# Patient Record
Sex: FEMALE | HISPANIC OR LATINO | Employment: UNEMPLOYED | URBAN - METROPOLITAN AREA
[De-identification: names, ages, dates, MRNs, and addresses within clinical notes are randomized per-mention and may not be internally consistent; named-entity substitution may affect disease eponyms.]

---

## 2019-02-20 ENCOUNTER — HOSPITAL ENCOUNTER (OUTPATIENT)
Dept: LAB | Facility: MEDICAL CENTER | Age: 19
End: 2019-02-20
Attending: OBSTETRICS & GYNECOLOGY
Payer: COMMERCIAL

## 2019-02-20 PROCEDURE — 87491 CHLMYD TRACH DNA AMP PROBE: CPT

## 2019-02-20 PROCEDURE — 87591 N.GONORRHOEAE DNA AMP PROB: CPT

## 2019-02-21 LAB — SPECIMEN SOURCE: NORMAL

## 2020-10-10 ENCOUNTER — HOSPITAL ENCOUNTER (EMERGENCY)
Facility: MEDICAL CENTER | Age: 20
End: 2020-10-10
Payer: COMMERCIAL

## 2020-10-10 ENCOUNTER — HOSPITAL ENCOUNTER (OUTPATIENT)
Facility: MEDICAL CENTER | Age: 20
End: 2020-10-11
Attending: OBSTETRICS & GYNECOLOGY | Admitting: OBSTETRICS & GYNECOLOGY
Payer: COMMERCIAL

## 2020-10-10 VITALS
HEART RATE: 107 BPM | OXYGEN SATURATION: 99 % | HEIGHT: 61 IN | BODY MASS INDEX: 24.06 KG/M2 | DIASTOLIC BLOOD PRESSURE: 90 MMHG | TEMPERATURE: 98.2 F | SYSTOLIC BLOOD PRESSURE: 141 MMHG | RESPIRATION RATE: 18 BRPM | WEIGHT: 127.43 LBS

## 2020-10-10 PROCEDURE — 302449 STATCHG TRIAGE ONLY (STATISTIC)

## 2020-10-11 VITALS
WEIGHT: 123 LBS | TEMPERATURE: 98.3 F | HEIGHT: 61 IN | SYSTOLIC BLOOD PRESSURE: 116 MMHG | HEART RATE: 98 BPM | BODY MASS INDEX: 23.22 KG/M2 | OXYGEN SATURATION: 97 % | DIASTOLIC BLOOD PRESSURE: 68 MMHG | RESPIRATION RATE: 17 BRPM

## 2020-10-11 ASSESSMENT — PAIN SCALES - GENERAL: PAINLEVEL: 0 - NO PAIN

## 2020-10-11 NOTE — PROGRESS NOTES
"0005: Pt presented to LD with c/o contractions/cramps. \"Contraction-like\" pain started around 1800 today. Reports intermittent discomfort. Hasn't felt contractions for about 25 minutes now. Denies LOF/VB. Reports feeling baby move, but \"better with first baby\". Reports previous delivery at home with midwife.   0023: Updated Dr Frye of pt c/o previous contraction, but none currently/no VB/no LOF. Confirmed not to perform SVE. Requested MD view tracing. Per MD, to d/c pt home.   0028: Monitors removed; pt reported feeling baby moving around more prior to taking monitors off.  0037: D/c paperwork signed. D/c instructions given to pt. Questions answered. Pt left unit via ambulation with FOB  "

## 2020-10-11 NOTE — ED TRIAGE NOTES
Patient is 19 weeks pregnant.  Patient started having abdominal cramping around 1800.  Spoke with her midwife who told her to come in and get checked out.  Denies bleeding or discharge.  Nauseous.

## 2020-10-16 ENCOUNTER — HOSPITAL ENCOUNTER (OUTPATIENT)
Dept: RADIOLOGY | Facility: MEDICAL CENTER | Age: 20
End: 2020-10-16
Attending: MIDWIFE
Payer: COMMERCIAL

## 2020-10-16 DIAGNOSIS — Z34.92 NORMAL PREGNANCY IN SECOND TRIMESTER: ICD-10-CM

## 2020-10-16 PROCEDURE — 76805 OB US >/= 14 WKS SNGL FETUS: CPT

## 2020-12-07 ENCOUNTER — HOSPITAL ENCOUNTER (OUTPATIENT)
Facility: MEDICAL CENTER | Age: 20
End: 2020-12-07
Attending: MIDWIFE
Payer: MEDICAID

## 2021-02-17 ENCOUNTER — HOSPITAL ENCOUNTER (OUTPATIENT)
Facility: MEDICAL CENTER | Age: 21
End: 2021-02-17
Attending: MIDWIFE
Payer: COMMERCIAL

## 2021-02-17 PROCEDURE — 87081 CULTURE SCREEN ONLY: CPT

## 2021-02-17 PROCEDURE — 87150 DNA/RNA AMPLIFIED PROBE: CPT

## 2021-02-18 ENCOUNTER — HOSPITAL ENCOUNTER (OUTPATIENT)
Facility: MEDICAL CENTER | Age: 21
End: 2021-02-18
Attending: MIDWIFE
Payer: COMMERCIAL

## 2021-02-23 LAB — GP B STREP DNA SPEC QL NAA+PROBE: NEGATIVE

## 2021-03-18 ENCOUNTER — HOSPITAL ENCOUNTER (OUTPATIENT)
Dept: RADIOLOGY | Facility: MEDICAL CENTER | Age: 21
End: 2021-03-18
Attending: MIDWIFE
Payer: COMMERCIAL

## 2021-03-18 DIAGNOSIS — Z34.93 ENCOUNTER FOR SUPERVISION OF NORMAL PREGNANCY IN THIRD TRIMESTER, UNSPECIFIED GRAVIDITY: ICD-10-CM

## 2021-03-18 PROCEDURE — 76819 FETAL BIOPHYS PROFIL W/O NST: CPT

## 2021-03-18 PROCEDURE — 76820 UMBILICAL ARTERY ECHO: CPT

## 2021-03-19 PROCEDURE — 76820 UMBILICAL ARTERY ECHO: CPT

## 2021-03-22 ENCOUNTER — HOSPITAL ENCOUNTER (OUTPATIENT)
Dept: RADIOLOGY | Facility: MEDICAL CENTER | Age: 21
End: 2021-03-22
Attending: MIDWIFE
Payer: COMMERCIAL

## 2021-03-22 DIAGNOSIS — Z34.93 ENCOUNTER FOR SUPERVISION OF NORMAL PREGNANCY IN THIRD TRIMESTER, UNSPECIFIED GRAVIDITY: ICD-10-CM

## 2021-03-22 PROCEDURE — 76816 OB US FOLLOW-UP PER FETUS: CPT

## 2021-05-21 ENCOUNTER — HOSPITAL ENCOUNTER (EMERGENCY)
Facility: MEDICAL CENTER | Age: 21
End: 2021-05-22
Attending: EMERGENCY MEDICINE
Payer: COMMERCIAL

## 2021-05-21 ENCOUNTER — APPOINTMENT (OUTPATIENT)
Dept: RADIOLOGY | Facility: MEDICAL CENTER | Age: 21
End: 2021-05-21
Attending: EMERGENCY MEDICINE
Payer: COMMERCIAL

## 2021-05-21 DIAGNOSIS — R00.1 BRADYCARDIA: ICD-10-CM

## 2021-05-21 DIAGNOSIS — R01.1 MURMUR, CARDIAC: ICD-10-CM

## 2021-05-21 DIAGNOSIS — R55 SYNCOPE, UNSPECIFIED SYNCOPE TYPE: ICD-10-CM

## 2021-05-21 LAB
ALBUMIN SERPL BCP-MCNC: 4.7 G/DL (ref 3.2–4.9)
ALBUMIN/GLOB SERPL: 1.6 G/DL
ALP SERPL-CCNC: 56 U/L (ref 30–99)
ALT SERPL-CCNC: 24 U/L (ref 2–50)
ANION GAP SERPL CALC-SCNC: 12 MMOL/L (ref 7–16)
AST SERPL-CCNC: 29 U/L (ref 12–45)
BILIRUB SERPL-MCNC: 0.3 MG/DL (ref 0.1–1.5)
BUN SERPL-MCNC: 10 MG/DL (ref 8–22)
CALCIUM SERPL-MCNC: 9.4 MG/DL (ref 8.5–10.5)
CHLORIDE SERPL-SCNC: 106 MMOL/L (ref 96–112)
CO2 SERPL-SCNC: 22 MMOL/L (ref 20–33)
CREAT SERPL-MCNC: 0.72 MG/DL (ref 0.5–1.4)
D DIMER PPP IA.FEU-MCNC: <0.27 UG/ML (FEU) (ref 0–0.5)
EKG IMPRESSION: NORMAL
GLOBULIN SER CALC-MCNC: 2.9 G/DL (ref 1.9–3.5)
GLUCOSE SERPL-MCNC: 88 MG/DL (ref 65–99)
HCG SERPL QL: NEGATIVE
POTASSIUM SERPL-SCNC: 3.5 MMOL/L (ref 3.6–5.5)
PROT SERPL-MCNC: 7.6 G/DL (ref 6–8.2)
SODIUM SERPL-SCNC: 140 MMOL/L (ref 135–145)
TROPONIN T SERPL-MCNC: <6 NG/L (ref 6–19)

## 2021-05-21 PROCEDURE — 85027 COMPLETE CBC AUTOMATED: CPT

## 2021-05-21 PROCEDURE — 93005 ELECTROCARDIOGRAM TRACING: CPT | Performed by: EMERGENCY MEDICINE

## 2021-05-21 PROCEDURE — 83880 ASSAY OF NATRIURETIC PEPTIDE: CPT

## 2021-05-21 PROCEDURE — 700105 HCHG RX REV CODE 258: Performed by: EMERGENCY MEDICINE

## 2021-05-21 PROCEDURE — 99284 EMERGENCY DEPT VISIT MOD MDM: CPT

## 2021-05-21 PROCEDURE — 84703 CHORIONIC GONADOTROPIN ASSAY: CPT

## 2021-05-21 PROCEDURE — 93005 ELECTROCARDIOGRAM TRACING: CPT

## 2021-05-21 PROCEDURE — 85007 BL SMEAR W/DIFF WBC COUNT: CPT

## 2021-05-21 PROCEDURE — 80053 COMPREHEN METABOLIC PANEL: CPT

## 2021-05-21 PROCEDURE — 71045 X-RAY EXAM CHEST 1 VIEW: CPT

## 2021-05-21 PROCEDURE — 84484 ASSAY OF TROPONIN QUANT: CPT

## 2021-05-21 PROCEDURE — 85379 FIBRIN DEGRADATION QUANT: CPT

## 2021-05-21 PROCEDURE — 94760 N-INVAS EAR/PLS OXIMETRY 1: CPT

## 2021-05-21 RX ORDER — SODIUM CHLORIDE 9 MG/ML
1000 INJECTION, SOLUTION INTRAVENOUS ONCE
Status: COMPLETED | OUTPATIENT
Start: 2021-05-21 | End: 2021-05-22

## 2021-05-21 RX ADMIN — SODIUM CHLORIDE 1000 ML: 9 INJECTION, SOLUTION INTRAVENOUS at 23:19

## 2021-05-22 VITALS
TEMPERATURE: 97.6 F | HEART RATE: 68 BPM | HEIGHT: 61 IN | RESPIRATION RATE: 18 BRPM | BODY MASS INDEX: 24.56 KG/M2 | OXYGEN SATURATION: 99 % | WEIGHT: 130.07 LBS | DIASTOLIC BLOOD PRESSURE: 76 MMHG | SYSTOLIC BLOOD PRESSURE: 120 MMHG

## 2021-05-22 LAB
BASOPHILS # BLD AUTO: 2.6 % (ref 0–1.8)
BASOPHILS # BLD: 0.15 K/UL (ref 0–0.12)
EOSINOPHIL # BLD AUTO: 0 K/UL (ref 0–0.51)
EOSINOPHIL NFR BLD: 0 % (ref 0–6.9)
ERYTHROCYTE [DISTWIDTH] IN BLOOD BY AUTOMATED COUNT: 34.9 FL (ref 35.9–50)
HCT VFR BLD AUTO: 43.7 % (ref 37–47)
HGB BLD-MCNC: 12.8 G/DL (ref 12–16)
LYMPHOCYTES # BLD AUTO: 1.91 K/UL (ref 1–4.8)
LYMPHOCYTES NFR BLD: 33 % (ref 22–41)
MANUAL DIFF BLD: NORMAL
MCH RBC QN AUTO: 21.2 PG (ref 27–33)
MCHC RBC AUTO-ENTMCNC: 29.3 G/DL (ref 33.6–35)
MCV RBC AUTO: 72.2 FL (ref 81.4–97.8)
MONOCYTES # BLD AUTO: 0.5 K/UL (ref 0–0.85)
MONOCYTES NFR BLD AUTO: 8.7 % (ref 0–13.4)
MORPHOLOGY BLD-IMP: NORMAL
NEUTROPHILS # BLD AUTO: 3.23 K/UL (ref 2–7.15)
NEUTROPHILS NFR BLD: 55.7 % (ref 44–72)
NRBC # BLD AUTO: 0 K/UL
NRBC BLD-RTO: 0 /100 WBC
NT-PROBNP SERPL IA-MCNC: 61 PG/ML (ref 0–125)
PLATELET # BLD AUTO: 304 K/UL (ref 164–446)
PLATELET BLD QL SMEAR: NORMAL
PMV BLD AUTO: 12.1 FL (ref 9–12.9)
POLYCHROMASIA BLD QL SMEAR: NORMAL
RBC # BLD AUTO: 6.05 M/UL (ref 4.2–5.4)
RBC BLD AUTO: PRESENT
SARS-COV-2 RNA RESP QL NAA+PROBE: NOTDETECTED
SPECIMEN SOURCE: NORMAL
WBC # BLD AUTO: 5.8 K/UL (ref 4.8–10.8)

## 2021-05-22 PROCEDURE — U0003 INFECTIOUS AGENT DETECTION BY NUCLEIC ACID (DNA OR RNA); SEVERE ACUTE RESPIRATORY SYNDROME CORONAVIRUS 2 (SARS-COV-2) (CORONAVIRUS DISEASE [COVID-19]), AMPLIFIED PROBE TECHNIQUE, MAKING USE OF HIGH THROUGHPUT TECHNOLOGIES AS DESCRIBED BY CMS-2020-01-R: HCPCS

## 2021-05-22 PROCEDURE — U0005 INFEC AGEN DETEC AMPLI PROBE: HCPCS

## 2021-05-22 PROCEDURE — 99284 EMERGENCY DEPT VISIT MOD MDM: CPT | Performed by: INTERNAL MEDICINE

## 2021-05-22 NOTE — ED NOTES
Patient see's Angelo Ford. My apologies for the misunderstanding. Please advise on scheduling. Patient prefers to be seen in the afternoon.    Pt ambulated to red 8, accompanied by her mother and her father. Changed into gown and connected to monitoring equipment.

## 2021-05-22 NOTE — CONSULTS
"Cardiology Initial Consult Note    Date of note:    5/22/2021      Consulting Physician: FELIZ Kwan*    Name:   Jaida Steiner   YOB: 2000  Age:   21 y.o.  female   MRN:   [id number]      Reason for Consultation: Bradycardia and heart murmur    HPI:  Jaida Steiner is a 21 y.o. female with no significant past medical history, is 2 months postpartum who presented to the emergency department with chief complaint of bradycardia and a near syncopal event.    Patient states that since the birth of her second child she has been experiencing episodes of dizziness, bradycardia on her Apple Watch and near syncopal events.  After the birth of her first child which was about 18 months ago her resting heart rate decreased from 60s to 50s beats per minute and now after the birth of her second child her resting heart rate has decreased from 50s to 40s beats per minute.  She generally does not check her blood pressure at home but believes that her episodes of dizziness occur with positional change.    Earlier today she got up and started walking from a sitting position and had a near syncopal event.  She was able to get a hold of herself and did not hit her head.  Denies associated palpitations, chest pain/pressure, dyspnea on exertion, lower extremity edema, headaches, blurry vision, ringing in the ear, vertigo, nausea or vomiting.  Does endorse occasional chest pressure and difficulty breathing.    In the emergency department, her EKG showed sinus bradycardia at a rate of 57 bpm.  Laboratory work-up was remarkable for hypokalemia with potassium 3.5, negative troponin and normal NT proBNP.      ROS  All others reviewed and negative except for pertinent positives mentioned in HPI.      Past Medical History:   Diagnosis Date   • Cold     \"Starting around 12-17-15\"   • Hemorrhagic disorder (HCC)     \"Nose-bleeds\"       Past Surgical History:   Procedure Laterality Date   • TONSILLECTOMY AND " ADENOIDECTOMY Bilateral 12/23/2015    Procedure: TONSILLECTOMY AND ADENOIDECTOMY;  Surgeon: Leanna Daigle M.D.;  Location: SURGERY SAME DAY [facility];  Service:          (Not in a hospital admission)    No current facility-administered medications for this encounter.     Current Outpatient Medications   Medication Sig Dispense Refill   • Cholecalciferol (VITAMIN D3 PO) Take 1 tablet by mouth every day.     • Ascorbic Acid (VITAMIN C PO) Take 1 tablet by mouth every day.     • Prenatal Vit-Fe Fumarate-FA (PRENATAL PO) Take 1 tablet by mouth every day.     • hydrocodone-acetaminophen 2.5-108 mg/5mL (HYCET) 7.5-325 MG/15ML solution Take 10-15 mL by mouth every four hours as needed for Severe Pain. (Patient not taking: Reported on 5/21/2021) 500 mL 0   • ondansetron (ZOFRAN ODT) 4 MG TABLET DISPERSIBLE Take 1 Tab by mouth every 6 hours as needed for Nausea/Vomiting. (Patient not taking: Reported on 5/21/2021) 10 Tab 1         No Known Allergies      History reviewed. No pertinent family history.      Social History     Socioeconomic History   • Marital status:      Spouse name: Not on file   • Number of children: Not on file   • Years of education: Not on file   • Highest education level: Not on file   Occupational History   • Not on file   Tobacco Use   • Smoking status: Never Smoker   • Smokeless tobacco: Never Used   Vaping Use   • Vaping Use: Never used   Substance and Sexual Activity   • Alcohol use: No   • Drug use: No   • Sexual activity: Not on file   Other Topics Concern   • Not on file   Social History Narrative   • Not on file     Social Determinants of Health     Financial Resource Strain:    • Difficulty of Paying Living Expenses:    Food Insecurity:    • Worried About Running Out of Food in the Last Year:    • Ran Out of Food in the Last Year:    Transportation Needs:    • Lack of Transportation (Medical):    • Lack of Transportation (Non-Medical):    Physical Activity:    • Days of  "Exercise per Week:    • Minutes of Exercise per Session:    Stress:    • Feeling of Stress :    Social Connections:    • Frequency of Communication with Friends and Family:    • Frequency of Social Gatherings with Friends and Family:    • Attends Mandae[employer] Services:    • Active Member of Clubs or Organizations:    • Attends Club or Organization Meetings:    • Marital Status:    Intimate Partner Violence:    • Fear of Current or Ex-Partner:    • Emotionally Abused:    • Physically Abused:    • Sexually Abused:            Intake/Output Summary (Last 24 hours) at 5/22/2021 0114  Last data filed at 5/22/2021 0057  Gross per 24 hour   Intake 1000 ml   Output --   Net 1000 ml        Physical Exam  Body mass index is 24.58 kg/m².  /76   Pulse 68   Temp 36.2 °C (97.1 °F) (Temporal)   Resp 18   Ht 1.549 m (5' 1\")   Wt 59 kg (130 lb 1.1 oz)   SpO2 99%   Vitals:    05/22/21 0000 05/22/21 0051 05/22/21 0052 05/22/21 0053   BP: 119/75 108/65 117/76 120/76   Pulse: (!) 52 (!) 55 (!) 57 68   Resp: (!) 21 18     Temp:       TempSrc:       SpO2: 99% 98% 97% 99%   Weight:       Height:         Oxygen Therapy:  Pulse Oximetry: 99 %, O2 Delivery Device: None - Room Air    General: Well appearing and in no apparent distress  Eyes: nl conjunctiva  ENT: OP clear  Neck: JVP not elevated,  no carotid bruits  Lungs: normal respiratory effort, CTAB  Heart: RRR, grade 1/6 systolic murmur best heard along the sternum, no rubs or gallops, no edema bilateral lower extremities. No LV/RV heave on cardiac palpatation. 2+ bilateral radial pulses.  2+ bilateral dp pulses.   Abdomen: soft, non tender, non distended, no masses, normal bowel sounds.  No HSM.  Extremities/MSK: no clubbing, no cyanosis  Neurological: No focal sensory deficits  Psychiatric: Appropriate affect, A/O x 3  Skin: Warm extremities      Labs (personally reviewed and notable for):   Lab Results   Component Value Date/Time    SODIUM 140 05/21/2021 10:50 PM    POTASSIUM " 3.5 (L) 2021 10:50 PM    CHLORIDE 106 2021 10:50 PM    CO2 22 2021 10:50 PM    GLUCOSE 88 2021 10:50 PM    BUN 10 2021 10:50 PM    CREATININE 0.72 2021 10:50 PM      Lab Results   Component Value Date/Time    WBC 5.8 2021 10:50 PM    RBC 6.05 (H) 2021 10:50 PM    HEMOGLOBIN 12.8 2021 10:50 PM    HEMATOCRIT 43.7 2021 10:50 PM    MCV 72.2 (L) 2021 10:50 PM    MCH 21.2 (L) 2021 10:50 PM    MCHC 29.3 (L) 2021 10:50 PM    MPV 12.1 2021 10:50 PM    NEUTSPOLYS 55.70 2021 10:50 PM    LYMPHOCYTES 33.00 2021 10:50 PM    MONOCYTES 8.70 2021 10:50 PM    EOSINOPHILS 0.00 2021 10:50 PM    BASOPHILS 2.60 (H) 2021 10:50 PM          Lab Results   Component Value Date/Time    TROPONINT <6 20210     Lab Results   Component Value Date/Time    NTPROBNP 61 2021 2250         Cardiac Imaging and Procedures Review:    EKG reviewed and as per the [facility]      Radiology test Review:  CXR: Reviewed, no cardiomegaly noted.      Impression and Medical Decision Making:  #Sinus bradycardia ?Symptomatic  #Dizziness  #Hypokalemia  #Innocent murmur  #Postpartum state    Recommendations:  --Recommend obtaining orthostatic vital signs to evaluate for chronotropic incompetence as a cause for her symptoms of dizziness.  --Unsure if she has symptomatic bradycardia.  Patient will benefit from longer monitoring either as an inpatient with telemetry monitoring or follow-up as an outpatient in the cardiology clinic for cardiac event monitor.  Both options have been discussed in detail with the patient and her family who is present at bedside.  Patient does prefer to go home as her  is exclusively breast-fed and follow-up as an outpatient in cardiology clinic which I think is reasonable.  --Innocent murmur heard.  No workup needed.   --Encourage fluid intake.  --Discussed ER precautions with the patient who voices  understanding.      Thank you for allowing me to participate in the care of this patient, I will continue to follow.  Please contact me with any questions.      Luther Cast M.D.  Cardiologist, [facility] [place]   [phone]    This note was generated using voice recognition software which has a small chance of producing errors of grammar and possibly content. I have made every reasonable attempt to find and correct any obvious errors, but expect that some may not be found prior to finalization of this note.

## 2021-05-22 NOTE — ED PROVIDER NOTES
"ED Provider Note    Scribed for German Lou M.D. by Amandeep Ortiz. 2021, 10:05 PM.    Primary care provider: Angelita Tariq M.D.  Means of arrival: Walk-in  History obtained from: Patient  History limited by: None    CHIEF COMPLAINT  Chief Complaint   Patient presents with    Bradycardia     Per patient, she was seen at urgent care today at 3pm. Per patient, her MD sent her to the ER stating \"some parts of her heart were not perfusing.\" Manual 1 minute pulse 60bpm at this time. Per patient, she fainted today; however, did not hit her head. Per patient, her PCP stated she also had a new heart murmur. Per patient, this has been occuring since she had her baby 2 months ago.     Sent by MD    Dizziness     worsening over past 2-3 days       HPI  Jaida Steiner is a 21 y.o. female who presents to the Emergency Department for evaluation of bradycardia that has been occurring over the last two months. She is  and has been having bradycardic episodes since the birth of her second child. She states that her fitbit has been reporting heart rates in the 30s and 40s. Today after she stood up and began walking she felt faint and \"things went black,\" though she did not fully lose consciousness. No head injury. She has mild shortness of breath and headaches. Over the last three days she has been feeling increasingly lightheaded, though this has been going on for some time. No chest pain, fevers, chills, abdominal pain, edema, nausea, vomiting, dysuria, or vaginal bleeding. She had an URI about a week ago. Her last menstrual period was May 2020.    REVIEW OF SYSTEMS  Pertinent positives include bradycardia, near syncopal, shortness of breath, and headaches. Pertinent negatives include no head injury, chest pain, fevers, chills, abdominal pain, edema, nausea, vomiting, dysuria, or vaginal bleeding. All other systems negative.    PAST MEDICAL HISTORY   has a past medical history of Cold and Hemorrhagic " "disorder (HCC).    SURGICAL HISTORY   has a past surgical history that includes tonsillectomy and adenoidectomy (Bilateral, 12/23/2015).    SOCIAL HISTORY  Social History     Tobacco Use    Smoking status: Never Smoker    Smokeless tobacco: Never Used   Vaping Use    Vaping Use: Never used   Substance Use Topics    Alcohol use: No    Drug use: No      Social History     Substance and Sexual Activity   Drug Use No       FAMILY HISTORY  History reviewed. No pertinent family history.    CURRENT MEDICATIONS  Current Outpatient Medications   Medication Instructions    hydrocodone-acetaminophen 2.5-108 mg/5mL (HYCET) 7.5-325 MG/15ML solution 10-15 mL, Oral, EVERY 4 HOURS PRN    ondansetron (ZOFRAN ODT) 4 mg, Oral, EVERY 6 HOURS PRN    VIORELE 0.15-0.02/0.01 MG (21/5) per tablet TAKE 1 TABLET BY ORAL ROUTE EVERY DAY       ALLERGIES  No Known Allergies    PHYSICAL EXAM  VITAL SIGNS: /85   Pulse 80   Temp 36.2 °C (97.1 °F) (Temporal)   Resp 18   Ht 1.549 m (5' 1\")   Wt 59 kg (130 lb 1.1 oz)   LMP  (LMP Unknown)   SpO2 99%   Breastfeeding Yes   BMI 24.58 kg/m²     Constitutional: Well developed, Well nourished, mild distress.   HENT: Normocephalic, Atraumatic. Mask in place.  Eyes: Conjunctiva normal, No discharge.   Neck: Supple, No stridor  Cardiovascular: Normal heart rate, Normal rhythm, 2/6 systolic murmur over left sternal border, equal pulses.   Pulmonary: Normal breath sounds, No respiratory distress, No wheezing, No rales, No rhonchi.  Chest: No chest wall tenderness or deformity.   Abdomen: Soft, No tenderness, No masses, no rebound, no guarding.   Back: No CVA tenderness.   Musculoskeletal: No major deformities noted, No tenderness. No calf tenderness or chords, appear symmetric  Skin: Warm, Dry, No erythema, No rash.   Neurologic: Alert & oriented x 3, Normal motor function,  No focal deficits noted.   Psychiatric: Affect normal, Judgment normal, Mood normal.     LABS  Results for orders placed or " performed during the hospital encounter of 05/21/21   HCG QUAL SERUM   Result Value Ref Range    Beta-Hcg Qualitative Serum Negative Negative   CBC WITH DIFFERENTIAL   Result Value Ref Range    WBC 5.8 4.8 - 10.8 K/uL    RBC 6.05 (H) 4.20 - 5.40 M/uL    Hemoglobin 12.8 12.0 - 16.0 g/dL    Hematocrit 43.7 37.0 - 47.0 %    MCV 72.2 (L) 81.4 - 97.8 fL    MCH 21.2 (L) 27.0 - 33.0 pg    MCHC 29.3 (L) 33.6 - 35.0 g/dL    RDW 34.9 (L) 35.9 - 50.0 fL    Platelet Count 304 164 - 446 K/uL    MPV 12.1 9.0 - 12.9 fL    Neutrophils-Polys 55.70 44.00 - 72.00 %    Lymphocytes 33.00 22.00 - 41.00 %    Monocytes 8.70 0.00 - 13.40 %    Eosinophils 0.00 0.00 - 6.90 %    Basophils 2.60 (H) 0.00 - 1.80 %    Nucleated RBC 0.00 /100 WBC    Neutrophils (Absolute) 3.23 2.00 - 7.15 K/uL    Lymphs (Absolute) 1.91 1.00 - 4.80 K/uL    Monos (Absolute) 0.50 0.00 - 0.85 K/uL    Eos (Absolute) 0.00 0.00 - 0.51 K/uL    Baso (Absolute) 0.15 (H) 0.00 - 0.12 K/uL    NRBC (Absolute) 0.00 K/uL   COMP METABOLIC PANEL   Result Value Ref Range    Sodium 140 135 - 145 mmol/L    Potassium 3.5 (L) 3.6 - 5.5 mmol/L    Chloride 106 96 - 112 mmol/L    Co2 22 20 - 33 mmol/L    Anion Gap 12.0 7.0 - 16.0    Glucose 88 65 - 99 mg/dL    Bun 10 8 - 22 mg/dL    Creatinine 0.72 0.50 - 1.40 mg/dL    Calcium 9.4 8.5 - 10.5 mg/dL    AST(SGOT) 29 12 - 45 U/L    ALT(SGPT) 24 2 - 50 U/L    Alkaline Phosphatase 56 30 - 99 U/L    Total Bilirubin 0.3 0.1 - 1.5 mg/dL    Albumin 4.7 3.2 - 4.9 g/dL    Total Protein 7.6 6.0 - 8.2 g/dL    Globulin 2.9 1.9 - 3.5 g/dL    A-G Ratio 1.6 g/dL   TROPONIN   Result Value Ref Range    Troponin T <6 6 - 19 ng/L   D-DIMER   Result Value Ref Range    D-Dimer Screen <0.27 0.00 - 0.50 ug/mL (FEU)   ESTIMATED GFR   Result Value Ref Range    GFR If African American >60 >60 mL/min/1.73 m 2    GFR If Non African American >60 >60 mL/min/1.73 m 2   SARS-CoV-2 PCR (24 hour In-House): Collect NP swab in VTM    Specimen: Respirate   Result Value Ref Range     SARS-CoV-2 Source NP Swab    PERIPHERAL SMEAR REVIEW   Result Value Ref Range    Peripheral Smear Review see below    PLATELET ESTIMATE   Result Value Ref Range    Plt Estimation Normal    MORPHOLOGY   Result Value Ref Range    RBC Morphology Present     Polychromia 1+    DIFFERENTIAL MANUAL   Result Value Ref Range    Manual Diff Status PERFORMED    proBrain Natriuretic Peptide, NT   Result Value Ref Range    NT-proBNP 61 0 - 125 pg/mL   EKG   Result Value Ref Range    Report       [facility] Emergency Dept.    Test Date:  2021  Pt Name:    LUZ MARINA SANTOS                Department: ER  MRN:        [id number]                      Room:  Gender:     Female                       Technician: [id number]  :        2000                   Requested By:ER TRIAGE PROTOCOL  Order #:    [id number]                    Reading MD: YI MCKOY MD    Measurements  Intervals                                Axis  Rate:       57                           P:          75  AR:         132                          QRS:        66  QRSD:       70                           T:          29  QT:         460  QTc:        448    Interpretive Statements  SINUS BRADYCARDIA, rate of 57, normal axis  NONSPECIFIC T ABNORMALITIES, ANTERIOR LEADS  No previous ECG available for comparison  Electronically Signed On 2021 22:26:17 PDT by YI MCKOY MD       All labs reviewed by me.    EKG  12 Lead EKG interpreted by me as shown above.    RADIOLOGY  DX-CHEST-PORTABLE (1 VIEW)   Final Result         1.  No acute cardiopulmonary disease.        The radiologist's interpretation of all radiological studies have been reviewed by me.    COURSE & MEDICAL DECISION MAKING  Pertinent Labs & Imaging studies reviewed. (See chart for details)    PPE Note: I verified that the patient was wearing a mask and I was wearing appropriate PPE every time I entered the room. The patient's mask was on the patient at all times  during my encounter except for a brief view of the oropharynx.     Manjuibe remained outside the patient's room and did not have any contact with the patient for the duration of patient encounter.     10:05 PM - Patient seen and examined at bedside. The patient will receive IV fluids for bradycardia. Ordered EKG, DX-chest, CBC with differential, CMP, Troponin, D-dimer, and HCG Qual Serum to evaluate her symptoms. The differential diagnoses include but are not limited to: Cardiomyopathy, electrolyte abnormality, dehydration, PE, vasgovagal syncope, MI     12:03 AM - Patient was reevaluated at bedside. Discussed lab and radiology results with the patient and informed them that she will need to be admitted for further cardiac workup. She is concerned about staying overnight as her young infant is exclusively breast fed. Paged Cardiology.    12:08 AM - I discussed the patient's case and the above findings with Dr. Cast (Cardiology) who agrees that it would be beneficial for patient to be admitted.     12:41 AM - Patient was reevaluated at bedside. Patient informed me that Dr. Cast informed her that she is okay to go home. Patient still doesn't want to be admitted.     There was a positive response to IV fluids after patient reevaluation.    Medical Decision Making: At this point time I think the patient may be having syncope secondary to bradycardia.  She does have a murmur.  Her chest x-ray does not show any enlarged cardiac silhouette or signs of congestive heart failure proBNP is negative.  Initial troponin is negative as well.  At this point time patient does not want to be admitted and therefore will be referred to cardiology as an outpatient for echo for her murmur.  Her EKG just shows bradycardia but does not show any signs of a block her electrolytes are otherwise unremarkable.     The patient will return for new or worsening symptoms and is stable at the time of discharge.    The patient is referred to a  primary physician for blood pressure management, diabetic screening, and for all other preventative health concerns.        DISPOSITION:  Patient will be discharged home in stable condition.    FOLLOW UP:  Luther Cast M.D.  [address] [address]  [phone]    Schedule an appointment as soon as possible for a visit   They should call you on monday if you don't hear from them by Tuesday please call.      OUTPATIENT MEDICATIONS:  Discharge Medication List as of 5/22/2021  1:47 AM            FINAL IMPRESSION  1. Bradycardia    2. Murmur, cardiac    3. Syncope, unspecified syncope type          I, Amandeep Ortiz (Manjuibsophie), am scribing for, and in the presence of, German Lou M.D.    Electronically signed by: Amandeep Ortiz (Scribe), 5/21/2021    IGerman M.D. personally performed the services described in this documentation, as scribed by Amandeep Ortiz in my presence, and it is both accurate and complete.    The note accurately reflects work and decisions made by me.  German Lou M.D.  5/22/2021  2:57 AM

## 2021-05-22 NOTE — ED NOTES
Med Rec completed: per patient at bedside with family   Preferred Pharmacy: [id number]  Allergies:  No Known Allergies    No ORAL antibiotics in last 14 days    Home Medications:  Medication Sig Comments   • Cholecalciferol (VITAMIN D3 PO) Take 1 tablet by mouth every day.    • Ascorbic Acid (VITAMIN C PO) Take 1 tablet by mouth every day.    • Prenatal Vit-Fe Fumarate-FA (PRENATAL PO) Take 1 tablet by mouth every day.      **Was advised by her physician today to start taking a baby aspirin daily but has not started that yet. Also reports she was given one time dose of aspirin in physicians office today.

## 2021-05-22 NOTE — ED TRIAGE NOTES
"Chief Complaint   Patient presents with   • Bradycardia     Per patient, she was seen at urgent care today at 3pm. Per patient, her MD sent her to the ER stating \"some parts of her heart were not perfusing.\" Manual 1 minute pulse 60bpm at this time. Per patient, she fainted today; however, did not hit her head. Per patient, her PCP stated she also had a new heart murmur. Per patient, this has been occuring since she had her baby 2 months ago.    • Sent by MD   • Dizziness     worsening over past 2-3 days     Pt amb to triage with steady gait for above complaint.  EKG completed.      Pt is alert and oriented, speaking in full sentences, follows commands and responds appropriately to questions. NAD. Resp are even and unlabored.     Pt placed in lobby. Pt educated on triage process. Pt encouraged to alert staff for any changes.    This RN masked and in appropriate PPE during encounter. Patient also in mask.     /85   Pulse 80   Temp 36.2 °C (97.1 °F) (Temporal)   Resp 18   Ht 1.549 m (5' 1\")   Wt 59 kg (130 lb 1.1 oz)   LMP  (LMP Unknown)   SpO2 99%   Breastfeeding Yes   BMI 24.58 kg/m²       "

## 2021-06-09 ENCOUNTER — HOSPITAL ENCOUNTER (OUTPATIENT)
Facility: MEDICAL CENTER | Age: 21
End: 2021-06-09
Attending: MIDWIFE
Payer: MEDICAID

## 2021-06-09 LAB
AMBIGUOUS DTTM AMBI4: NORMAL
CYTOLOGY REG CYTOL: NORMAL

## 2021-06-09 PROCEDURE — 88175 CYTOPATH C/V AUTO FLUID REDO: CPT

## 2021-08-09 ENCOUNTER — APPOINTMENT (OUTPATIENT)
Dept: CARDIOLOGY | Facility: MEDICAL CENTER | Age: 21
End: 2021-08-09
Attending: NURSE PRACTITIONER
Payer: MEDICAID

## 2023-07-24 ENCOUNTER — OFFICE VISIT (OUTPATIENT)
Dept: URGENT CARE | Facility: CLINIC | Age: 23
End: 2023-07-24
Payer: COMMERCIAL

## 2023-07-24 VITALS
TEMPERATURE: 98.3 F | DIASTOLIC BLOOD PRESSURE: 80 MMHG | HEIGHT: 61 IN | RESPIRATION RATE: 16 BRPM | BODY MASS INDEX: 22.83 KG/M2 | OXYGEN SATURATION: 99 % | SYSTOLIC BLOOD PRESSURE: 116 MMHG | HEART RATE: 75 BPM | WEIGHT: 120.9 LBS
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
## 2023-07-24 PROCEDURE — 99203 OFFICE O/P NEW LOW 30 MIN: CPT | Performed by: PHYSICIAN ASSISTANT

## 2023-07-24 PROCEDURE — 3074F SYST BP LT 130 MM HG: CPT | Performed by: PHYSICIAN ASSISTANT

## 2023-07-24 PROCEDURE — 3079F DIAST BP 80-89 MM HG: CPT | Performed by: PHYSICIAN ASSISTANT

## 2023-07-24 RX ORDER — OMEPRAZOLE 20 MG/1
20 CAPSULE, DELAYED RELEASE ORAL DAILY
COMMUNITY

## 2023-07-24 RX ORDER — DOXYCYCLINE HYCLATE 100 MG
100 TABLET ORAL 2 TIMES DAILY
Qty: 20 TABLET | Refills: 0 | Status: SHIPPED | OUTPATIENT
Start: 2023-07-24 | End: 2023-08-03

## 2023-07-24 ASSESSMENT — ENCOUNTER SYMPTOMS
CHILLS: 0
FEVER: 0

## 2023-07-25 NOTE — PROGRESS NOTES
Subjective:   Jaida Steiner is a 23 y.o. female who presents today with   Chief Complaint   Patient presents with    Insect Bite     (R) back of thigh possible tick bite X 3 days redness, itchiness, hot to the touch, irritated, swollen        Other  This is a new problem. Episode onset: 3 days. The problem occurs constantly. The problem has been unchanged. Associated symptoms include a rash. Pertinent negatives include no chills or fever.     Was in an area of [place] and patient states she has had friends who have had tick bites and a friend's father who had Lyme disease recently.  She states she did not see any ticks but did have multiple mosquito bites and then noticed a large area of redness on the back of her right thigh.    PMH:  has a past medical history of Cold and Hemorrhagic disorder (HCC).  MEDS:   Current Outpatient Medications:     omeprazole (PRILOSEC) 20 MG delayed-release capsule, Take 20 mg by mouth every day., Disp: , Rfl:     doxycycline (VIBRAMYCIN) 100 MG Tab, Take 1 Tablet by mouth 2 times a day for 10 days., Disp: 20 Tablet, Rfl: 0    Cholecalciferol (VITAMIN D3 PO), Take 1 tablet by mouth every day., Disp: , Rfl:     Ascorbic Acid (VITAMIN C PO), Take 1 tablet by mouth every day., Disp: , Rfl:     Prenatal Vit-Fe Fumarate-FA (PRENATAL PO), Take 1 tablet by mouth every day., Disp: , Rfl:     hydrocodone-acetaminophen 2.5-108 mg/5mL (HYCET) 7.5-325 MG/15ML solution, Take 10-15 mL by mouth every four hours as needed for Severe Pain. (Patient not taking: Reported on 5/21/2021), Disp: 500 mL, Rfl: 0    ondansetron (ZOFRAN ODT) 4 MG TABLET DISPERSIBLE, Take 1 Tab by mouth every 6 hours as needed for Nausea/Vomiting. (Patient not taking: Reported on 5/21/2021), Disp: 10 Tab, Rfl: 1  ALLERGIES: No Known Allergies  SURGHX:   Past Surgical History:   Procedure Laterality Date    TONSILLECTOMY AND ADENOIDECTOMY Bilateral 12/23/2015    Procedure: TONSILLECTOMY AND ADENOIDECTOMY;  Surgeon:  "Leanna Daigle M.D.;  Location: SURGERY SAME DAY [facility];  Service:      SOCHX:  reports that she has never smoked. She has never used smokeless tobacco. She reports current alcohol use. She reports that she does not use drugs.  FH: Reviewed with patient, not pertinent to this visit.       Review of Systems   Constitutional:  Negative for chills and fever.   Skin:  Positive for rash.        Objective:   /80   Pulse 75   Temp 36.8 °C (98.3 °F) (Temporal)   Resp 16   Ht 1.549 m (5' 1\")   Wt 54.8 kg (120 lb 14.4 oz)   SpO2 99%   BMI 22.84 kg/m²   Physical Exam  Vitals and nursing note reviewed.   Constitutional:       General: She is not in acute distress.     Appearance: Normal appearance. She is well-developed. She is not ill-appearing or toxic-appearing.   HENT:      Head: Normocephalic and atraumatic.      Right Ear: Hearing normal.      Left Ear: Hearing normal.   Cardiovascular:      Rate and Rhythm: Normal rate and regular rhythm.      Heart sounds: Normal heart sounds.   Pulmonary:      Effort: Pulmonary effort is normal.      Breath sounds: Normal breath sounds.   Musculoskeletal:      Comments: Normal movement in all 4 extremities   Skin:     General: Skin is warm and dry.             Comments: Erythematous central area approximately 2 cm in diameter with very mild faint surrounding erythematous target-like area.  No drainage or discharge.  No induration or fluctuance.  No necrosis.  No open wound site.   Neurological:      Mental Status: She is alert.      Coordination: Coordination normal.   Psychiatric:         Mood and Affect: Mood normal.         Assessment/Plan:   Assessment    1. Bug bite, initial encounter  - doxycycline (VIBRAMYCIN) 100 MG Tab; Take 1 Tablet by mouth 2 times a day for 10 days.  Dispense: 20 Tablet; Refill: 0    Other orders  - omeprazole (PRILOSEC) 20 MG delayed-release capsule; Take 20 mg by mouth every day.    Patient does have a rash that could be consistent " with erythema migrans although no tick was identified I recommend treating with doxycycline and patient is agreeable with this plan.  Would suggest following up with primary care.  Did discuss potential side effects and risk of breast-feeding while on the antibiotic and she is fully understanding.  She states that her 2-year-old only suckles for comfort once or twice a day.  Discussed with patient regardless of if it is a tick bite with erythema migrans versus secondary bacterial infection from initial bug bite I recommend treating with antibiotics.  Patient is fully understanding of possible side effects of medication and being present potentially in breastmilk and is agreeable with having the antibiotics at this time.  Suggest minimizing the amount of breast-feeding although it already seems to be very minimal would recommend trying to avoid breast-feeding at all.    Differential diagnosis, natural history, supportive care, and indications for immediate follow-up discussed.   Patient given instructions and understanding of medications and treatment.    If not improving in 3-5 days, F/U with PCP or return to UC if symptoms worsen.  Strict ER precautions given.  Patient agreeable to plan.        Please note that this dictation was created using voice recognition software. I have made every reasonable attempt to correct obvious errors, but I expect that there are errors of grammar and possibly content that I did not discover before finalizing the note.    Jerel Lin PA-C